# Patient Record
Sex: FEMALE | Race: BLACK OR AFRICAN AMERICAN | NOT HISPANIC OR LATINO | Employment: FULL TIME | URBAN - METROPOLITAN AREA
[De-identification: names, ages, dates, MRNs, and addresses within clinical notes are randomized per-mention and may not be internally consistent; named-entity substitution may affect disease eponyms.]

---

## 2019-09-25 ENCOUNTER — HOSPITAL ENCOUNTER (EMERGENCY)
Facility: OTHER | Age: 24
Discharge: HOME | End: 2019-09-25
Attending: EMERGENCY MEDICINE
Payer: MEDICAID

## 2019-09-25 VITALS
SYSTOLIC BLOOD PRESSURE: 119 MMHG | RESPIRATION RATE: 17 BRPM | WEIGHT: 230 LBS | HEART RATE: 115 BPM | OXYGEN SATURATION: 97 % | BODY MASS INDEX: 38.32 KG/M2 | TEMPERATURE: 99 F | HEIGHT: 65 IN | DIASTOLIC BLOOD PRESSURE: 73 MMHG

## 2019-09-25 DIAGNOSIS — L05.91 CHRONIC RECURRENT PILONIDAL CYST WITHOUT ABSCESS: Primary | ICD-10-CM

## 2019-09-25 LAB
B-HCG UR QL: NEGATIVE
CTP QC/QA: YES

## 2019-09-25 PROCEDURE — 81025 URINE PREGNANCY TEST: CPT | Performed by: EMERGENCY MEDICINE

## 2019-09-25 PROCEDURE — 25000003 PHARM REV CODE 250: Performed by: PHYSICIAN ASSISTANT

## 2019-09-25 PROCEDURE — 10080 I&D PILONIDAL CYST SIMPLE: CPT

## 2019-09-25 PROCEDURE — 99283 EMERGENCY DEPT VISIT LOW MDM: CPT | Mod: 25

## 2019-09-25 RX ORDER — LIDOCAINE HYDROCHLORIDE 10 MG/ML
10 INJECTION INFILTRATION; PERINEURAL
Status: COMPLETED | OUTPATIENT
Start: 2019-09-25 | End: 2019-09-25

## 2019-09-25 RX ORDER — SULFAMETHOXAZOLE AND TRIMETHOPRIM 800; 160 MG/1; MG/1
1 TABLET ORAL 2 TIMES DAILY
Qty: 10 TABLET | Refills: 0 | Status: SHIPPED | OUTPATIENT
Start: 2019-09-25 | End: 2019-09-30

## 2019-09-25 RX ADMIN — LIDOCAINE HYDROCHLORIDE 10 ML: 10 INJECTION, SOLUTION INFILTRATION; PERINEURAL at 08:09

## 2019-09-26 NOTE — ED PROVIDER NOTES
"Encounter Date: 9/25/2019       History     Chief Complaint   Patient presents with    Pilonidal Disease     since last Friday, unsure of drainage     Patient is a 24-year-old female who presents to the emergency department with a painful cyst to her lower back.  Patient states she has history of pilonidal cyst at become infected.  She states she has had inpatient drainage by surgeon but has never had surgical procedure.  She states the pain began 5 days ago and is getting worse.  She states she is on the end of the course of taking doxycycline for a skin infection to her left axilla.  She states she went to outside ER 5 days ago and was given topical antibiotics.  Denies fever, chills or fatigue.  She states she has not apply the topical antibiotic because she read online that does for "scrapes and cuts."       The history is provided by the patient.     Review of patient's allergies indicates:  No Known Allergies  No past medical history on file.  No past surgical history on file.  No family history on file.  Social History     Tobacco Use    Smoking status: Never Smoker   Substance Use Topics    Alcohol use: No     Alcohol/week: 0.0 standard drinks    Drug use: No     Review of Systems   Constitutional: Negative for chills, fatigue and fever.   Gastrointestinal: Negative for abdominal pain, diarrhea, nausea and vomiting.   Genitourinary: Positive for dysuria.   Musculoskeletal: Positive for back pain.   Skin: Positive for wound. Negative for rash.   Allergic/Immunologic: Negative for immunocompromised state.   Neurological: Negative for headaches.       Physical Exam     Initial Vitals [09/25/19 1904]   BP Pulse Resp Temp SpO2   137/71 99 18 98.5 °F (36.9 °C) 100 %      MAP       --         Physical Exam    Constitutional: Vital signs are normal. She is cooperative. No distress.   Eyes: Conjunctivae and EOM are normal.   Neck: Normal range of motion. Neck supple.   Cardiovascular: Regular rhythm. "   Pulmonary/Chest: No respiratory distress.   Abdominal: Soft. There is no tenderness.   Neurological: She is alert and oriented to person, place, and time. GCS eye subscore is 4. GCS verbal subscore is 5. GCS motor subscore is 6.   Skin: Skin is warm and dry. No rash noted.   3 x 3 cm of a area of induration, edema, erythema and small central area of fluctuance between the gluteal cleft 1 visualized sinus tract.   Psychiatric: She has a normal mood and affect. Her behavior is normal.         ED Course   I & D - Incision and Drainage  Date/Time: 9/26/2019 12:45 PM  Performed by: Abram Shultz PA-C  Authorized by: Ewelina William MD   Consent Done: Not Needed  Type: pilonidal cyst  Body area: trunk  Location details: back  Anesthesia: local infiltration    Anesthesia:  Local Anesthetic: lidocaine 1% without epinephrine  Anesthetic total: 5 mL  Scalpel size: 11  Incision type: single straight  Drainage: bloody  Drainage amount: scant  Wound treatment: expression of material  Patient tolerance: Patient tolerated the procedure well with no immediate complications        Labs Reviewed   POCT URINE PREGNANCY          Imaging Results    None          Medical Decision Making:   Initial Assessment:   Urgent evaluation of a 24 y.o. female with history of recurrent pilonidal cysts presenting to the emergency department complaining of painful applying the [place]. Patient is afebrile, nontoxic appearing and hemodynamically stable.  Exam patient has upon the assisted appears infected with central area of fluctuance.  She is taking doxycycline.  She has no systemic sequelae of symptoms. No significant overlying cellulitis.  ED Management:  Incision and drain was attempted but a only small amount of bloody material was expressed.  Patient is encouraged to apply topical antibiotic she was given and will send home with 5 day course of Bactrim.  She is strongly encouraged follow-up with her general surgeon or return here for new  worsening symptoms.                      Clinical Impression:     1. Chronic recurrent pilonidal cyst without abscess                               Abram Shultz PA-C  09/26/19 2055

## 2019-09-26 NOTE — ED TRIAGE NOTES
Pt reports to ED via ambulatory with complaints of pain and irritation from recurrent abscess to mid tail bone area x 4 days; pt is AAOx3 with slight amount of distress noted at this time. Pt VSS at this time; pt will continue to be monitored.

## 2019-09-27 ENCOUNTER — NURSE TRIAGE (OUTPATIENT)
Dept: ADMINISTRATIVE | Facility: CLINIC | Age: 24
End: 2019-09-27

## 2019-09-27 NOTE — TELEPHONE ENCOUNTER
Cyst was drained in ED on 9/25. Is soft now but wondering what she should do. Was given antibiotics. And pain medication.    Reason for Disposition   [1] Taking antibiotic < 72 hours (3 days) AND [2] boil symptoms the SAME (not improved)   Boil < 1/2 inch across (< 12 mm; smaller than a marble)    Additional Information   Negative: [1] Widespread red rash AND [2] fever AND [3] fainted or too weak to stand   Negative: Sounds like a life-threatening emergency to the triager   Negative: [1] Boil (skin abscess) AND [2] has been seen but not treated (no antibiotic or I + D)   Negative: MRSA, questions about (No boil or other skin lesion)   Negative: [1] Fever AND [2] > 105 F (40.6 C) by any route OR axillary > 104 F (40 C)   Negative: [1] Widespread rash AND [2] bright red, sunburn-like AND [3] new-onset   Negative: Black (necrotic) tissue or blisters develop in the boil   Negative: Child sounds very sick or weak to the triager   Negative: [1] Spreading redness much WORSE (rapid spread) AND [2] fever   Negative: [1] Spreading redness much WORSE (rapid spread) AND [2] on the face   Negative: [1] SEVERE pain (excruciating) AND [2] not improved after 2 hours of pain medicine   Negative: Age < 6 months (EXCEPTION: triager can easily answer caller's question)   Negative: [1] Weak immune system (sickle cell disease, HIV, splenectomy, chemotherapy, organ transplant, chronic oral steroids, etc) AND [2] caller has question   Negative: [1] Recent hospitalization AND [2] child not improved or WORSE   Negative: Triager concerned about patient's response to recommended treatment plan   Negative: [1] Caller has URGENT question (includes medication questions) AND [2] triager not able to answer   Negative: Center of the boil has become soft or pus-colored   Negative: [1] Taking antibiotic > 24 hours AND [2] spreading redness around the boil WORSE AND [3] no fever   Negative: [1] New fever AND [2] not taking an  antibiotic   Negative: [1] Taking antibiotic > 24 hours AND [2] other symptoms (e.g., fever) WORSE   Negative: [1] Taking antibiotic > 48 hours AND [2] fever still present (SAME)   Negative: [1] Taking antibiotic > 72 hours (3 days) AND [2] boil symptoms the SAME (redness or pain not improved)   Negative: More boils occur   Negative: [1] Caller has NON-URGENT question (includes medication questions) AND [2] triager not able to answer   Negative: Boil starts to drain pus   Negative: Needs boil re-check appointment (per nurse judgment)   Negative: [1] Finished taking antibiotics AND [2] boil symptoms are BETTER, BUT [3] not completely gone  (still has redness or pain)   Negative: [1] Widespread rash AND [2] bright red, sunburn-like AND [3] too weak to stand   Negative: Sounds like a life-threatening emergency to the triager   Negative: Painful lump or swelling at opening to anus (rectum)   Negative: Painful lump or swelling at opening to vagina (on labia)   Negative: Painful lump or swelling on scrotum   Negative: Impetigo suspected or diagnosed   Negative: Doesn't match the SYMPTOMS of a boil   Negative: MRSA, questions about (No boil or other skin lesion)   Negative: Widespread red rash   Negative: Black (necrotic) color or blisters develop in wound   Negative: Patient sounds very sick or weak to the triager   Negative: SEVERE pain (e.g., excruciating)   Negative: Red streak from area of infection   Negative: Fever > 100.5 F (38.1 C)   Negative: Boil > 2 inches across (> 5 cm; larger than a golf ball or ping pong ball)   Negative: [1] Boil > 1/2 inch across (> 12 mm; larger than a marble) AND [2] center is soft or pus colored   Negative: [1] Boil > 1/4 inch across (> 6 mm; larger than a pencil eraser) AND [2] on face   Negative: [1] Boil AND [2] diabetes mellitus or weak immune system (e.g., HIV positive, cancer chemotherapy, transplant patient)   Negative: 2 or more boils   Negative: [1]  Spreading redness around the boil AND [2] no fever   Negative: Boil > 1/2 inch across (> 12 mm; larger than a marble)   Negative: [1] Boil AND [2] not improved > 3 days following CARE ADVICE   Negative: Boils are a recurrent problem for this patient (patient with no boil now)   Negative: Boils are a recurrent problem for this family (patient with no boil now)    Protocols used: BOIL (SKIN ABSCESS) ON TREATMENT FOLLOW-UP CALL-P-, BOIL (SKIN ABSCESS)-A-

## 2020-10-21 ENCOUNTER — HOSPITAL ENCOUNTER (EMERGENCY)
Facility: OTHER | Age: 25
Discharge: HOME | End: 2020-10-21
Attending: EMERGENCY MEDICINE
Payer: MEDICAID

## 2020-10-21 VITALS
RESPIRATION RATE: 18 BRPM | HEIGHT: 66 IN | WEIGHT: 220 LBS | TEMPERATURE: 98 F | DIASTOLIC BLOOD PRESSURE: 77 MMHG | BODY MASS INDEX: 35.36 KG/M2 | OXYGEN SATURATION: 97 % | SYSTOLIC BLOOD PRESSURE: 116 MMHG | HEART RATE: 96 BPM

## 2020-10-21 DIAGNOSIS — G44.209 TENSION-TYPE HEADACHE, NOT INTRACTABLE, UNSPECIFIED CHRONICITY PATTERN: Primary | ICD-10-CM

## 2020-10-21 LAB
B-HCG UR QL: NEGATIVE
CTP QC/QA: YES

## 2020-10-21 PROCEDURE — 99283 EMERGENCY DEPT VISIT LOW MDM: CPT

## 2020-10-21 PROCEDURE — 81025 URINE PREGNANCY TEST: CPT | Performed by: EMERGENCY MEDICINE

## 2020-10-21 PROCEDURE — 25000003 PHARM REV CODE 250: Performed by: PHYSICIAN ASSISTANT

## 2020-10-21 RX ORDER — BUTALBITAL, ACETAMINOPHEN AND CAFFEINE 50; 325; 40 MG/1; MG/1; MG/1
1 TABLET ORAL EVERY 4 HOURS PRN
Qty: 10 TABLET | Refills: 0 | Status: SHIPPED | OUTPATIENT
Start: 2020-10-21 | End: 2020-11-20

## 2020-10-21 RX ORDER — BUTALBITAL, ACETAMINOPHEN AND CAFFEINE 50; 325; 40 MG/1; MG/1; MG/1
1 TABLET ORAL
Status: COMPLETED | OUTPATIENT
Start: 2020-10-21 | End: 2020-10-21

## 2020-10-21 RX ORDER — PROMETHAZINE HYDROCHLORIDE 25 MG/1
25 TABLET ORAL
Status: COMPLETED | OUTPATIENT
Start: 2020-10-21 | End: 2020-10-21

## 2020-10-21 RX ADMIN — BUTALBITAL, ACETAMINOPHEN, AND CAFFEINE 1 TABLET: 50; 325; 40 TABLET ORAL at 11:10

## 2020-10-21 RX ADMIN — PROMETHAZINE HYDROCHLORIDE 25 MG: 25 TABLET ORAL at 11:10

## 2020-10-21 NOTE — Clinical Note
"Eduardo Jonespaz Santos was seen and treated in our emergency department on 10/21/2020.  She may return to work on 10/22/2020.       If you have any questions or concerns, please don't hesitate to call.      [facility]"

## 2020-10-21 NOTE — ED PROVIDER NOTES
Encounter Date: 10/21/2020       History     Chief Complaint   Patient presents with    Headache     pt with c/o right frontal headache x one week  . no change in pain with otc.      Afebrile 25-year-old female with no significant past medical history presents the ED for evaluation of headache.  Patient states that headache began gradually Sunday.  She states that headache has gradually intensified since this time.  She describes it as a bandlike headache to the forehead with some radiation to the back of the head.  She denies any associated symptoms with the headache including vision changes, fever, chills, photophobia, URI symptoms, neck pain, numbness, tingling or weakness.  She did try some caffeine earlier today and reported some improvement in headache.  He has not tried any additional medications.  She does report that she was corrective lenses however did not bring with and has not had new prescription sometime.        Review of patient's allergies indicates:  No Known Allergies  History reviewed. No pertinent past medical history.  History reviewed. No pertinent surgical history.  History reviewed. No pertinent family history.  Social History     Tobacco Use    Smoking status: Never Smoker    Smokeless tobacco: Never Used   Substance Use Topics    Alcohol use: No     Alcohol/week: 0.0 standard drinks    Drug use: No     Review of Systems   Constitutional: Negative for appetite change, chills and fever.   HENT: Negative for congestion and sore throat.    Eyes: Negative for visual disturbance.   Respiratory: Negative for cough and shortness of breath.    Cardiovascular: Negative for chest pain.   Gastrointestinal: Negative for nausea and vomiting.   Genitourinary: Negative for dysuria.   Musculoskeletal: Negative for arthralgias, back pain and neck stiffness.   Skin: Negative for rash and wound.   Allergic/Immunologic: Negative for immunocompromised state.   Neurological: Positive for headaches. Negative  for dizziness, weakness and light-headedness.   Hematological: Does not bruise/bleed easily.   Psychiatric/Behavioral: Negative for confusion.       Physical Exam     Initial Vitals [10/21/20 1022]   BP Pulse Resp Temp SpO2   126/80 (!) 119 18 98.2 °F (36.8 °C) 96 %      MAP       --         Vitals:    10/21/20 1236   BP: 116/77   Pulse: 96   Resp: 18   Temp: 98.2 °F (36.8 °C)       Physical Exam    Nursing note and vitals reviewed.  Constitutional: Vital signs are normal. She appears well-developed and well-nourished. She is cooperative.  Non-toxic appearance. She does not appear ill. No distress.   HENT:   Head: Normocephalic and atraumatic.   Eyes: Conjunctivae and lids are normal.   Neck: Neck supple. No neck rigidity.   Cardiovascular: Normal rate and regular rhythm.   Pulmonary/Chest: Breath sounds normal. No respiratory distress. She has no wheezes. She has no rhonchi.   Abdominal: Soft. Normal appearance. There is no abdominal tenderness. There is no rigidity.   Musculoskeletal: Normal range of motion.   Neurological: She is alert and oriented to person, place, and time. She has normal strength. No cranial nerve deficit or sensory deficit. Gait normal. GCS score is 15. GCS eye subscore is 4. GCS verbal subscore is 5. GCS motor subscore is 6.   Skin: Skin is warm, dry and intact. No rash noted.   Psychiatric: She has a normal mood and affect. Her speech is normal and behavior is normal. Thought content normal.         ED Course   Procedures  Labs Reviewed   POCT URINE PREGNANCY - Normal          Imaging Results    None          Medical Decision Making:   Initial Assessment:   Well-appearing 25-year-old female with no significant past medical history presents to the ED for evaluation headache.  Patient with no focal neuro deficit.  HEENT exam is unremarkable remaining exam grossly unremarkable.  Initial tachycardia however this did improve throughout ED course.  Differential Diagnosis:   Differential Diagnosis  includes, but is not limited to:  Ischemic stroke, hemorrhagic stroke, subarachnoid hemorrhage/ruptured aneurysm, intracranial lesion/mass, meningitis/encephalitis, epidural hematoma, subdural hematoma, pseudotumor cerebri, venous sinus thrombosis, CO poisoning, hypertensive encephalopathy, MI/ACS, head trauma/contusion, concussion, sinus headache, dehydration, anxiety, medication non-compliance, primary headache (tension/cluster/migraine).    ED Management:  Patient has no neck stiffness/meningismus, fever, elevated WBC count, elevated blood pressure, confusion, vision loss, temporal artery tenderness, rash, vomiting, photophobia or thunderclap onset to suggest pseudotumor cerebri, meningitis, tumor, ICH, temporal arteritis, or encephalitis.  Patient has no focal neuro deficit.  Discussed with patient that symptoms are most consistent with tension headache.  She did report improvement headache in the ED with Fioricet and Phenergan.  I was notified by nurse that patient stated her ride was outside and she felt comfortable going home.  We will send home with short course of Fioricet with instructions on use.  I did also instruct patient to have follow-up with eye doctor given she has not had updated prescription in some time and this could be contributing to headache. Strict instructions to follow up with primary care physician or reference provided for further assessment and evaluation. Given instructions to return for any acute symptoms and verbalized understanding of this medical plan.                                 Clinical Impression:       ICD-10-CM ICD-9-CM   1. Tension-type headache, not intractable, unspecified chronicity pattern  G44.209 339.10                          ED Disposition Condition    Discharge Stable        ED Prescriptions     Medication Sig Dispense Start Date End Date Auth. Provider    butalbital-acetaminophen-caffeine -40 mg (FIORICET, ESGIC) -40 mg per tablet Take 1 tablet by  mouth every 4 (four) hours as needed for Pain. 10 tablet 10/21/2020 11/20/2020 [facility]        Follow-up Information     Follow up With Specialties Details Why Contact Info    [facility]  Go to   [address], [address]  [facility] [address]  [phone]      [facility]  If symptoms worsen [address]  [facility] [address]  [phone]                                       [facility] [employer]  10/21/20 5084

## 2020-10-21 NOTE — FIRST PROVIDER EVALUATION
Emergency Department TeleTriage Encounter Note      CHIEF COMPLAINT    Chief Complaint   Patient presents with    Headache     pt with c/o right frontal headache x one week  . no change in pain with otc.        VITAL SIGNS   Initial Vitals [10/21/20 1022]   BP Pulse Resp Temp SpO2   126/80 (!) 119 18 98.2 °F (36.8 °C) 96 %      MAP       --            ALLERGIES    Review of patient's allergies indicates:  No Known Allergies    PROVIDER TRIAGE NOTE  CC: headache    HPI: Eduardo Santos, a 25 y.o. female presents to the ED evaluation of headache x 1 week.  Denies photo/phonophobia.  Treatments tried at home include tramadol and advil with little improvement.  No obvious neurologic deficits.  Deferring pain management to accepting provider.          ORDERS  Labs Reviewed   POCT URINE PREGNANCY       ED Orders (720h ago, onward)    Start Ordered     Status Ordering Provider    10/21/20 1025 10/21/20 1024  POCT urine pregnancy  Once      Ordered BOWEN SALMERON            Virtual Visit Note: The provider triage portion of this emergency department evaluation and documentation was performed via [employer], a HIPAA-compliant telemedicine application, in concert with a tele-presenter in the room. A face to face patient evaluation with one of my colleagues will occur once the patient is placed in an emergency department room.      DISCLAIMER: This note was prepared with [employer] voice recognition transcription software. Garbled syntax, mangled pronouns, and other bizarre constructions may be attributed to that software system.

## 2020-10-21 NOTE — ED NOTES
Pt presents to the ED w/ c/o frontal headache x 1 week.  Reports pain radiates to rest of head upon movement.  OTC medications do not help.  Denies nausea, vomiting, neck pain/stiffness, photophobia.

## 2021-04-26 ENCOUNTER — PATIENT MESSAGE (OUTPATIENT)
Dept: RESEARCH | Facility: HOSPITAL | Age: 26
End: 2021-04-26

## 2021-12-15 ENCOUNTER — OFFICE VISIT (OUTPATIENT)
Dept: OBSTETRICS AND GYNECOLOGY | Facility: CLINIC | Age: 26
End: 2021-12-15
Payer: MEDICAID

## 2021-12-15 VITALS
WEIGHT: 273.13 LBS | DIASTOLIC BLOOD PRESSURE: 80 MMHG | SYSTOLIC BLOOD PRESSURE: 118 MMHG | BODY MASS INDEX: 40.45 KG/M2 | HEIGHT: 69 IN

## 2021-12-15 DIAGNOSIS — N94.6 DYSMENORRHEA: ICD-10-CM

## 2021-12-15 DIAGNOSIS — N92.6 IRREGULAR MENSES: ICD-10-CM

## 2021-12-15 DIAGNOSIS — Z01.419 WELL WOMAN EXAM WITH ROUTINE GYNECOLOGICAL EXAM: Primary | ICD-10-CM

## 2021-12-15 PROCEDURE — 99213 OFFICE O/P EST LOW 20 MIN: CPT | Mod: PBBFAC | Performed by: STUDENT IN AN ORGANIZED HEALTH CARE EDUCATION/TRAINING PROGRAM

## 2021-12-15 PROCEDURE — 88141 CYTOPATH C/V INTERPRET: CPT | Mod: ,,, | Performed by: PATHOLOGY

## 2021-12-15 PROCEDURE — 99385 PR PREVENTIVE VISIT,NEW,18-39: ICD-10-PCS | Mod: S$PBB,,, | Performed by: STUDENT IN AN ORGANIZED HEALTH CARE EDUCATION/TRAINING PROGRAM

## 2021-12-15 PROCEDURE — 87591 N.GONORRHOEAE DNA AMP PROB: CPT | Performed by: STUDENT IN AN ORGANIZED HEALTH CARE EDUCATION/TRAINING PROGRAM

## 2021-12-15 PROCEDURE — 88141 PR  CYTOPATH CERV/VAG INTERPRET: ICD-10-PCS | Mod: ,,, | Performed by: PATHOLOGY

## 2021-12-15 PROCEDURE — 99999 PR PBB SHADOW E&M-EST. PATIENT-LVL III: ICD-10-PCS | Mod: PBBFAC,,, | Performed by: STUDENT IN AN ORGANIZED HEALTH CARE EDUCATION/TRAINING PROGRAM

## 2021-12-15 PROCEDURE — 88175 CYTOPATH C/V AUTO FLUID REDO: CPT | Performed by: PATHOLOGY

## 2021-12-15 PROCEDURE — 99999 PR PBB SHADOW E&M-EST. PATIENT-LVL III: CPT | Mod: PBBFAC,,, | Performed by: STUDENT IN AN ORGANIZED HEALTH CARE EDUCATION/TRAINING PROGRAM

## 2021-12-15 PROCEDURE — 99385 PREV VISIT NEW AGE 18-39: CPT | Mod: S$PBB,,, | Performed by: STUDENT IN AN ORGANIZED HEALTH CARE EDUCATION/TRAINING PROGRAM

## 2021-12-15 PROCEDURE — 87491 CHLMYD TRACH DNA AMP PROBE: CPT | Performed by: STUDENT IN AN ORGANIZED HEALTH CARE EDUCATION/TRAINING PROGRAM

## 2021-12-15 RX ORDER — IBUPROFEN 600 MG/1
600 TABLET ORAL EVERY 6 HOURS PRN
Qty: 60 TABLET | Refills: 2 | Status: SHIPPED | OUTPATIENT
Start: 2021-12-15 | End: 2023-04-10

## 2021-12-21 LAB
C TRACH DNA SPEC QL NAA+PROBE: NOT DETECTED
N GONORRHOEA DNA SPEC QL NAA+PROBE: NOT DETECTED

## 2021-12-23 ENCOUNTER — PATIENT MESSAGE (OUTPATIENT)
Dept: OBSTETRICS AND GYNECOLOGY | Facility: CLINIC | Age: 26
End: 2021-12-23
Payer: MEDICAID

## 2021-12-23 DIAGNOSIS — B37.31 CANDIDA VAGINITIS: Primary | ICD-10-CM

## 2021-12-23 LAB
FINAL PATHOLOGIC DIAGNOSIS: NORMAL
Lab: NORMAL

## 2021-12-23 RX ORDER — FLUCONAZOLE 150 MG/1
150 TABLET ORAL ONCE
Qty: 1 TABLET | Refills: 1 | Status: SHIPPED | OUTPATIENT
Start: 2021-12-23 | End: 2021-12-23

## 2021-12-23 RX ORDER — FLUCONAZOLE 150 MG/1
150 TABLET ORAL ONCE
Qty: 1 TABLET | Refills: 1 | Status: SHIPPED | OUTPATIENT
Start: 2021-12-23 | End: 2021-12-23 | Stop reason: SDUPTHER

## 2022-07-07 ENCOUNTER — PATIENT MESSAGE (OUTPATIENT)
Dept: OBSTETRICS AND GYNECOLOGY | Facility: CLINIC | Age: 27
End: 2022-07-07
Payer: MEDICAID

## 2022-07-07 RX ORDER — SULFAMETHOXAZOLE AND TRIMETHOPRIM 800; 160 MG/1; MG/1
1 TABLET ORAL 2 TIMES DAILY
Qty: 6 TABLET | Refills: 0 | Status: SHIPPED | OUTPATIENT
Start: 2022-07-07 | End: 2022-11-20

## 2022-12-28 ENCOUNTER — PATIENT MESSAGE (OUTPATIENT)
Dept: OBSTETRICS AND GYNECOLOGY | Facility: CLINIC | Age: 27
End: 2022-12-28
Payer: MEDICAID

## 2023-09-22 ENCOUNTER — PATIENT MESSAGE (OUTPATIENT)
Dept: OBSTETRICS AND GYNECOLOGY | Facility: CLINIC | Age: 28
End: 2023-09-22
Payer: MEDICAID